# Patient Record
(demographics unavailable — no encounter records)

---

## 2025-06-26 NOTE — HISTORY OF PRESENT ILLNESS
[FreeTextEntry1] : 2025. DINORA CHAPMAN 60 year old female  LMP PM presents for annual visit.   She reports recent UTI. She took two rounds of antibiotics, unsure if it is resolved.  She complains of uterine prolapse. She denies vaginal bleeding, abn discharge or vaginitis sxs. No urinary complaints. She has normal BM, no bloody stool. She denies abdominal or pelvic pain.  Denies changes in medical status, medications, serious illness, hospitalizations, and surgeries.   PMH: denies GYNhx: Fibroids, ovarian cysts OBHx:  SHx: denies FHx: denies Med: denies All: NKDA FHx: Denies FHx of breast, ovarian, uterine or colon cancer. PHQ9= 0  [TextBox_4] : TVUS 11/2024- Stable right ovarian cyst, stable left ovarian cyst, stable fibroids ranging from 8mm,7mm, 4mm.  [Mammogramdate] : 9/2024 [TextBox_19] : BIRADS1. Due 9/2025. Advised to schedule. Rx given.  [BreastSonogramDate] : 9/2024 [TextBox_25] : BIRADS1. Due 9/2025. Advised to schedule. Rx given.  [PapSmeardate] : 3/2024 [TextBox_31] : NILM, HPV not detected [BoneDensityDate] : 9/2024 [TextBox_37] : mild osteopenia, -2.2 [TextBox_43] : never done

## 2025-06-26 NOTE — PLAN
Body Location Override (Optional): Left medial forehead
Which Doctor Performed Mohs? (Optional): Dave Haney do
[FreeTextEntry1] : 60 year old female pt presents for routine gyn exam: Breast and pelvic exam performed Pap/HPV conducted Rx given for mammogram and breast sonogram Pt to schedule pelvic sono and bone density Advised to schedule colonoscopy- Reviewed Cologuard as alternative colon ca screening. Referrals given for GI MD DXA d/w patient  Recent UTI:  UA/Urine cuture sent  Hx of fibroids:  Advised to schedule repeat pelvic sono to monitor  Thyroid Nodule- left side: Rx given for thyroid sono  Uterine Prolapse: Discussed options for conservative measures, pessary or Robotic supracervical hysterectomy, reviewed all R/B/A Pt will consider and let me know if she wishes to proceed. Advised kegel exercises to manage prolapse.  Osteopenia: D/w pt increased calcium intake in diet and Vit D 1000 U intake, & weight-bearing exercises (i.e. walking) for 30 min daily Advised to schedule 9/2026.    RTO Eric OBRIEN am scribing for and in the presence of MICK Montesinos M.D. in the following sections: HISTORY OF PRESENT ILLNESS, PAST MEDICAL/FAMILY/SOCIAL HISTORY, REVIEW OF SYSTEMS, PHYSICAL EXAM, ASSESSMENT/PLAN.

## 2025-06-26 NOTE — PHYSICAL EXAM
[Chaperoned Physical Exam] : A chaperone was present in the examining room during all aspects of the physical examination. [Appropriately responsive] : appropriately responsive [Alert] : alert [No Acute Distress] : no acute distress [No Lymphadenopathy] : no lymphadenopathy [Thyroid Nodule] : thyroid nodule [Regular Rate Rhythm] : regular rate rhythm [No Murmurs] : no murmurs [Clear to Auscultation B/L] : clear to auscultation bilaterally [Soft] : soft [Non-tender] : non-tender [Non-distended] : non-distended [No HSM] : No HSM [No Lesions] : no lesions [No Mass] : no mass [Oriented x3] : oriented x3 [Examination Of The Breasts] : a normal appearance [No Masses] : no breast masses were palpable [Vulvar Atrophy] : vulvar atrophy [Labia Majora] : normal [Labia Minora] : normal [Atrophy] : atrophy [Cystocele] : a cystocele [Uterine Prolapse] : uterine prolapse [Normal] : normal [Uterine Adnexae] : normal [FreeTextEntry2] : mauri walton [FreeTextEntry1] : kurtis [FreeTextEntry3] : left-side thyroid fullness [FreeTextEntry4] : 2nd-3rd degree uterine prolapse, very small cystocele  [FreeTextEntry9] : Guaiac test negative, no masses noted